# Patient Record
Sex: MALE | Employment: FULL TIME
[De-identification: names, ages, dates, MRNs, and addresses within clinical notes are randomized per-mention and may not be internally consistent; named-entity substitution may affect disease eponyms.]

---

## 2022-09-06 ENCOUNTER — OFFICE VISIT (OUTPATIENT)
Dept: INTERNAL MEDICINE CLINIC | Facility: CLINIC | Age: 52
End: 2022-09-06
Payer: COMMERCIAL

## 2022-09-06 VITALS
SYSTOLIC BLOOD PRESSURE: 127 MMHG | HEIGHT: 69 IN | BODY MASS INDEX: 30.81 KG/M2 | HEART RATE: 101 BPM | DIASTOLIC BLOOD PRESSURE: 85 MMHG | WEIGHT: 208 LBS | OXYGEN SATURATION: 98 % | TEMPERATURE: 98.1 F

## 2022-09-06 DIAGNOSIS — E78.49 OTHER HYPERLIPIDEMIA: ICD-10-CM

## 2022-09-06 DIAGNOSIS — I10 ESSENTIAL HYPERTENSION: ICD-10-CM

## 2022-09-06 DIAGNOSIS — R74.8 ELEVATED LIVER ENZYMES: ICD-10-CM

## 2022-09-06 DIAGNOSIS — I10 ESSENTIAL HYPERTENSION: Primary | ICD-10-CM

## 2022-09-06 LAB
ALBUMIN SERPL-MCNC: 4.2 G/DL (ref 3.5–5)
ALBUMIN/GLOB SERPL: 1 {RATIO} (ref 1.2–3.5)
ALP SERPL-CCNC: 41 U/L (ref 50–136)
ALT SERPL-CCNC: 55 U/L (ref 12–65)
ANION GAP SERPL CALC-SCNC: 5 MMOL/L (ref 4–13)
AST SERPL-CCNC: 89 U/L (ref 15–37)
BASOPHILS # BLD: 0 K/UL (ref 0–0.2)
BASOPHILS NFR BLD: 1 % (ref 0–2)
BILIRUB DIRECT SERPL-MCNC: 0.6 MG/DL
BILIRUB SERPL-MCNC: 1.3 MG/DL (ref 0.2–1.1)
BUN SERPL-MCNC: 7 MG/DL (ref 6–23)
CALCIUM SERPL-MCNC: 10.2 MG/DL (ref 8.3–10.4)
CHLORIDE SERPL-SCNC: 100 MMOL/L (ref 101–110)
CHOLEST SERPL-MCNC: 217 MG/DL
CO2 SERPL-SCNC: 29 MMOL/L (ref 21–32)
CREAT SERPL-MCNC: 1 MG/DL (ref 0.8–1.5)
DIFFERENTIAL METHOD BLD: NORMAL
EOSINOPHIL # BLD: 0.2 K/UL (ref 0–0.8)
EOSINOPHIL NFR BLD: 3 % (ref 0.5–7.8)
ERYTHROCYTE [DISTWIDTH] IN BLOOD BY AUTOMATED COUNT: 12.5 % (ref 11.9–14.6)
GLOBULIN SER CALC-MCNC: 4.2 G/DL (ref 2.3–3.5)
GLUCOSE SERPL-MCNC: 96 MG/DL (ref 65–100)
HCT VFR BLD AUTO: 45 % (ref 41.1–50.3)
HDLC SERPL-MCNC: 46 MG/DL (ref 40–60)
HDLC SERPL: 4.7 {RATIO}
HGB BLD-MCNC: 14.9 G/DL (ref 13.6–17.2)
IMM GRANULOCYTES # BLD AUTO: 0 K/UL (ref 0–0.5)
IMM GRANULOCYTES NFR BLD AUTO: 0 % (ref 0–5)
LDLC SERPL CALC-MCNC: 140.8 MG/DL
LYMPHOCYTES # BLD: 1.7 K/UL (ref 0.5–4.6)
LYMPHOCYTES NFR BLD: 28 % (ref 13–44)
MCH RBC QN AUTO: 31.5 PG (ref 26.1–32.9)
MCHC RBC AUTO-ENTMCNC: 33.1 G/DL (ref 31.4–35)
MCV RBC AUTO: 95.1 FL (ref 79.6–97.8)
MONOCYTES # BLD: 0.7 K/UL (ref 0.1–1.3)
MONOCYTES NFR BLD: 11 % (ref 4–12)
NEUTS SEG # BLD: 3.3 K/UL (ref 1.7–8.2)
NEUTS SEG NFR BLD: 57 % (ref 43–78)
NRBC # BLD: 0 K/UL (ref 0–0.2)
PLATELET # BLD AUTO: 202 K/UL (ref 150–450)
PMV BLD AUTO: 10 FL (ref 9.4–12.3)
POTASSIUM SERPL-SCNC: 3.7 MMOL/L (ref 3.5–5.1)
PROT SERPL-MCNC: 8.4 G/DL (ref 6.3–8.2)
RBC # BLD AUTO: 4.73 M/UL (ref 4.23–5.6)
SODIUM SERPL-SCNC: 134 MMOL/L (ref 138–145)
TRIGL SERPL-MCNC: 151 MG/DL (ref 35–150)
VLDLC SERPL CALC-MCNC: 30.2 MG/DL (ref 6–23)
WBC # BLD AUTO: 5.9 K/UL (ref 4.3–11.1)

## 2022-09-06 PROCEDURE — 99214 OFFICE O/P EST MOD 30 MIN: CPT | Performed by: INTERNAL MEDICINE

## 2022-09-06 RX ORDER — FENOFIBRATE 160 MG/1
160 TABLET ORAL DAILY
Qty: 90 TABLET | Refills: 1 | Status: CANCELLED | OUTPATIENT
Start: 2022-09-06

## 2022-09-06 RX ORDER — LISINOPRIL 20 MG/1
20 TABLET ORAL DAILY
Qty: 90 TABLET | Refills: 1 | Status: SHIPPED | OUTPATIENT
Start: 2022-09-06

## 2022-09-06 SDOH — ECONOMIC STABILITY: FOOD INSECURITY: WITHIN THE PAST 12 MONTHS, THE FOOD YOU BOUGHT JUST DIDN'T LAST AND YOU DIDN'T HAVE MONEY TO GET MORE.: NEVER TRUE

## 2022-09-06 SDOH — ECONOMIC STABILITY: FOOD INSECURITY: WITHIN THE PAST 12 MONTHS, YOU WORRIED THAT YOUR FOOD WOULD RUN OUT BEFORE YOU GOT MONEY TO BUY MORE.: NEVER TRUE

## 2022-09-06 ASSESSMENT — PATIENT HEALTH QUESTIONNAIRE - PHQ9
SUM OF ALL RESPONSES TO PHQ QUESTIONS 1-9: 0
9. THOUGHTS THAT YOU WOULD BE BETTER OFF DEAD, OR OF HURTING YOURSELF: 0
SUM OF ALL RESPONSES TO PHQ QUESTIONS 1-9: 0
2. FEELING DOWN, DEPRESSED OR HOPELESS: 0
8. MOVING OR SPEAKING SO SLOWLY THAT OTHER PEOPLE COULD HAVE NOTICED. OR THE OPPOSITE, BEING SO FIGETY OR RESTLESS THAT YOU HAVE BEEN MOVING AROUND A LOT MORE THAN USUAL: 0
6. FEELING BAD ABOUT YOURSELF - OR THAT YOU ARE A FAILURE OR HAVE LET YOURSELF OR YOUR FAMILY DOWN: 0
SUM OF ALL RESPONSES TO PHQ9 QUESTIONS 1 & 2: 0
10. IF YOU CHECKED OFF ANY PROBLEMS, HOW DIFFICULT HAVE THESE PROBLEMS MADE IT FOR YOU TO DO YOUR WORK, TAKE CARE OF THINGS AT HOME, OR GET ALONG WITH OTHER PEOPLE: 0
3. TROUBLE FALLING OR STAYING ASLEEP: 0
4. FEELING TIRED OR HAVING LITTLE ENERGY: 0
SUM OF ALL RESPONSES TO PHQ QUESTIONS 1-9: 0
7. TROUBLE CONCENTRATING ON THINGS, SUCH AS READING THE NEWSPAPER OR WATCHING TELEVISION: 0
SUM OF ALL RESPONSES TO PHQ QUESTIONS 1-9: 0
1. LITTLE INTEREST OR PLEASURE IN DOING THINGS: 0
5. POOR APPETITE OR OVEREATING: 0

## 2022-09-06 ASSESSMENT — ENCOUNTER SYMPTOMS
ABDOMINAL PAIN: 0
SHORTNESS OF BREATH: 0

## 2022-09-06 ASSESSMENT — SOCIAL DETERMINANTS OF HEALTH (SDOH): HOW HARD IS IT FOR YOU TO PAY FOR THE VERY BASICS LIKE FOOD, HOUSING, MEDICAL CARE, AND HEATING?: NOT HARD AT ALL

## 2022-09-06 NOTE — PROGRESS NOTES
Rea Adams M.D. Internal Medicine  [address] [facility] [address] , [address]   Office : [phone]  Fax : [phone]    Chief Complaint   Patient presents with    Hypertension     4 mo follow up       History of Present Illness:  Tripp Connor is a 50 y.o. male. HPI    Hypertension  Patient is in for Hypertension which is stable. Diet and Lifestyle: generally follows a low sodium diet  exercises sporadically  nonsmoker  Home BP Monitoring: is not measured at home . Patient's recent blood pressures were   BP Readings from Last 3 Encounters:   09/06/22 127/85   05/02/22 108/70   12/28/21 109/69   . taking medications as instructed, no medication side effects noted, no TIA's, no chest pain on exertion, no dyspnea on exertion, no swelling of ankles. Cardiac risk factors consist of dyslipidemia, hypertension, and male gender. Hyperlipidemia  Patient is in for follow-up for hyperlipidemia. Diet and Lifestyle: generally follows a low fat low cholesterol diet. Risk factors for vascular disease consist of hyperlipidemia and hypertension. Last LDL was   Lab Results   Component Value Date    LDLCALC 158 (H) 12/28/2021   . Last ALT was   Lab Results   Component Value Date/Time    ALT 26 12/28/2021 04:16 PM   .  No muscle aches. Has been on fenofibrate since prior to 2013 and triglycerides have not been very high so will stop it      Alcohol/Elevated Liver Enzymes  He is still drinking too much alcohol. Tried lexapro intermittently . He could not tolerate naltrexone due to nausea.    Discussed FAVOR  Lab Results   Component Value Date    ALT 26 12/28/2021    AST 48 (H) 12/28/2021    ALKPHOS 31 (L) 12/28/2021    BILITOT 0.5 12/28/2021         Past Medical History:  Past Medical History:   Diagnosis Date    Elevated liver enzymes     Drinks 5 light beers a day    Hyperlipidemia     CT Calcium score = 0 in 2015    Hypertension      Past Surgical History:  Past Surgical History:   Procedure Laterality Date    COLONOSCOPY  2012    VASECTOMY       Allergies:   No Known Allergies  Medications:   Current Outpatient Medications   Medication Sig Dispense Refill    lisinopril (PRINIVIL;ZESTRIL) 20 MG tablet Take 1 tablet by mouth daily TAKE ONE TABLET BY MOUTH EVERY DAY 90 tablet 1    fenofibrate (TRIGLIDE) 160 MG tablet Take 160 mg by mouth daily      mupirocin (BACTROBAN) 2 % ointment Apply topically daily       No current facility-administered medications for this visit. Social History:  Social History     Tobacco Use    Smoking status: Never    Smokeless tobacco: Never   Substance Use Topics    Alcohol use: Yes     Alcohol/week: 41.0 standard drinks     Family History  Family History   Problem Relation Age of Onset    Cancer Mother         breast    Cancer Father         colon       Review of Systems   Constitutional:  Negative for chills, fatigue and fever. Respiratory:  Negative for shortness of breath. Cardiovascular:  Negative for chest pain. Gastrointestinal:  Negative for abdominal pain. Skin:  Negative for rash. Neurological:  Negative for speech difficulty. Psychiatric/Behavioral:  Negative for dysphoric mood. Vital Signs  /85 (Site: Left Upper Arm, Position: Sitting, Cuff Size: Large Adult)   Pulse (!) 101   Temp 98.1 °F (36.7 °C) (Temporal)   Ht 5' 9\" (1.753 m)   Wt 208 lb (94.3 kg)   SpO2 98%   BMI 30.72 kg/m²   Body mass index is 30.72 kg/m². Physical Exam  Vitals reviewed. Constitutional:       General: He is not in acute distress. Appearance: Normal appearance. He is not ill-appearing. HENT:      Head: Normocephalic and atraumatic. Eyes:      General: No scleral icterus. Extraocular Movements: Extraocular movements intact. Conjunctiva/sclera: Conjunctivae normal.   Neck:      Vascular: No carotid bruit. Cardiovascular:      Rate and Rhythm: Normal rate and regular rhythm.       Heart sounds: Normal heart sounds. No murmur heard. Pulmonary:      Effort: Pulmonary effort is normal.      Breath sounds: Normal breath sounds. Musculoskeletal:         General: No swelling. Skin:     Coloration: Skin is not jaundiced. Findings: No rash. Neurological:      General: No focal deficit present. Mental Status: He is alert. Mental status is at baseline. Cranial Nerves: No cranial nerve deficit. Motor: No weakness. Gait: Gait normal.   Psychiatric:         Mood and Affect: Mood normal.         Behavior: Behavior normal.         Thought Content: Thought content normal.         Judgment: Judgment normal.         Assessment/Plan:  Doretha Evangelista was seen today for hypertension. Diagnoses and all orders for this visit:    Essential hypertension  -     lisinopril (PRINIVIL;ZESTRIL) 20 MG tablet; Take 1 tablet by mouth daily TAKE ONE TABLET BY MOUTH EVERY DAY  -     CBC with Auto Differential; Future  -     Basic Metabolic Panel; Future    Other hyperlipidemia  -     Lipid Panel; Future  -     Hepatic Function Panel; Future    Elevated liver enzymes  -     Hepatic Function Panel; Future      Return in about 6 months (around 3/6/2023), or if symptoms worsen or fail to improve.   __  Sari Hawkins M.D.

## 2022-10-27 RX ORDER — FENOFIBRATE 160 MG/1
TABLET ORAL
Qty: 90 TABLET | Refills: 1 | OUTPATIENT
Start: 2022-10-27

## 2022-12-27 RX ORDER — FENOFIBRATE 160 MG/1
160 TABLET ORAL DAILY
Qty: 90 TABLET | Refills: 0 | Status: SHIPPED | OUTPATIENT
Start: 2022-12-27

## 2022-12-27 NOTE — TELEPHONE ENCOUNTER
Pt called in requesting medication refill for  fenofibrate (TRIGLIDE) 160 MG tablet  To be sent to [facility] in [place]. Pt can be reached at [phone]. Thank you.

## 2023-03-06 ENCOUNTER — OFFICE VISIT (OUTPATIENT)
Dept: INTERNAL MEDICINE CLINIC | Facility: CLINIC | Age: 53
End: 2023-03-06
Payer: COMMERCIAL

## 2023-03-06 VITALS
DIASTOLIC BLOOD PRESSURE: 75 MMHG | WEIGHT: 218 LBS | SYSTOLIC BLOOD PRESSURE: 116 MMHG | BODY MASS INDEX: 32.29 KG/M2 | HEIGHT: 69 IN | OXYGEN SATURATION: 98 % | TEMPERATURE: 97.6 F | HEART RATE: 67 BPM

## 2023-03-06 DIAGNOSIS — E78.49 OTHER HYPERLIPIDEMIA: Primary | ICD-10-CM

## 2023-03-06 DIAGNOSIS — F10.10 ALCOHOL ABUSE: ICD-10-CM

## 2023-03-06 DIAGNOSIS — I10 ESSENTIAL HYPERTENSION: ICD-10-CM

## 2023-03-06 PROCEDURE — 3074F SYST BP LT 130 MM HG: CPT | Performed by: INTERNAL MEDICINE

## 2023-03-06 PROCEDURE — 99214 OFFICE O/P EST MOD 30 MIN: CPT | Performed by: INTERNAL MEDICINE

## 2023-03-06 PROCEDURE — 3078F DIAST BP <80 MM HG: CPT | Performed by: INTERNAL MEDICINE

## 2023-03-06 RX ORDER — FENOFIBRATE 160 MG/1
160 TABLET ORAL DAILY
Qty: 90 TABLET | Refills: 1 | Status: SHIPPED | OUTPATIENT
Start: 2023-03-06

## 2023-03-06 RX ORDER — LISINOPRIL 20 MG/1
TABLET ORAL
Qty: 90 TABLET | Refills: 1 | Status: SHIPPED | OUTPATIENT
Start: 2023-03-06

## 2023-03-06 SDOH — ECONOMIC STABILITY: INCOME INSECURITY: HOW HARD IS IT FOR YOU TO PAY FOR THE VERY BASICS LIKE FOOD, HOUSING, MEDICAL CARE, AND HEATING?: NOT HARD AT ALL

## 2023-03-06 SDOH — ECONOMIC STABILITY: FOOD INSECURITY: WITHIN THE PAST 12 MONTHS, YOU WORRIED THAT YOUR FOOD WOULD RUN OUT BEFORE YOU GOT MONEY TO BUY MORE.: NEVER TRUE

## 2023-03-06 SDOH — ECONOMIC STABILITY: FOOD INSECURITY: WITHIN THE PAST 12 MONTHS, THE FOOD YOU BOUGHT JUST DIDN'T LAST AND YOU DIDN'T HAVE MONEY TO GET MORE.: NEVER TRUE

## 2023-03-06 SDOH — ECONOMIC STABILITY: HOUSING INSECURITY
IN THE LAST 12 MONTHS, WAS THERE A TIME WHEN YOU DID NOT HAVE A STEADY PLACE TO SLEEP OR SLEPT IN A SHELTER (INCLUDING NOW)?: NO

## 2023-03-06 ASSESSMENT — PATIENT HEALTH QUESTIONNAIRE - PHQ9
SUM OF ALL RESPONSES TO PHQ QUESTIONS 1-9: 0
5. POOR APPETITE OR OVEREATING: 0
3. TROUBLE FALLING OR STAYING ASLEEP: 0
4. FEELING TIRED OR HAVING LITTLE ENERGY: 0
SUM OF ALL RESPONSES TO PHQ QUESTIONS 1-9: 0
SUM OF ALL RESPONSES TO PHQ QUESTIONS 1-9: 0
9. THOUGHTS THAT YOU WOULD BE BETTER OFF DEAD, OR OF HURTING YOURSELF: 0
4. FEELING TIRED OR HAVING LITTLE ENERGY: 0
2. FEELING DOWN, DEPRESSED OR HOPELESS: 0
SUM OF ALL RESPONSES TO PHQ QUESTIONS 1-9: 0
6. FEELING BAD ABOUT YOURSELF - OR THAT YOU ARE A FAILURE OR HAVE LET YOURSELF OR YOUR FAMILY DOWN: 0
SUM OF ALL RESPONSES TO PHQ9 QUESTIONS 1 & 2: 0
10. IF YOU CHECKED OFF ANY PROBLEMS, HOW DIFFICULT HAVE THESE PROBLEMS MADE IT FOR YOU TO DO YOUR WORK, TAKE CARE OF THINGS AT HOME, OR GET ALONG WITH OTHER PEOPLE: 0
SUM OF ALL RESPONSES TO PHQ QUESTIONS 1-9: 0
5. POOR APPETITE OR OVEREATING: 0
SUM OF ALL RESPONSES TO PHQ QUESTIONS 1-9: 0
8. MOVING OR SPEAKING SO SLOWLY THAT OTHER PEOPLE COULD HAVE NOTICED. OR THE OPPOSITE, BEING SO FIGETY OR RESTLESS THAT YOU HAVE BEEN MOVING AROUND A LOT MORE THAN USUAL: 0
SUM OF ALL RESPONSES TO PHQ QUESTIONS 1-9: 0
10. IF YOU CHECKED OFF ANY PROBLEMS, HOW DIFFICULT HAVE THESE PROBLEMS MADE IT FOR YOU TO DO YOUR WORK, TAKE CARE OF THINGS AT HOME, OR GET ALONG WITH OTHER PEOPLE: 0
2. FEELING DOWN, DEPRESSED OR HOPELESS: 0
8. MOVING OR SPEAKING SO SLOWLY THAT OTHER PEOPLE COULD HAVE NOTICED. OR THE OPPOSITE, BEING SO FIGETY OR RESTLESS THAT YOU HAVE BEEN MOVING AROUND A LOT MORE THAN USUAL: 0
3. TROUBLE FALLING OR STAYING ASLEEP: 0
9. THOUGHTS THAT YOU WOULD BE BETTER OFF DEAD, OR OF HURTING YOURSELF: 0
6. FEELING BAD ABOUT YOURSELF - OR THAT YOU ARE A FAILURE OR HAVE LET YOURSELF OR YOUR FAMILY DOWN: 0
1. LITTLE INTEREST OR PLEASURE IN DOING THINGS: 0
7. TROUBLE CONCENTRATING ON THINGS, SUCH AS READING THE NEWSPAPER OR WATCHING TELEVISION: 0
SUM OF ALL RESPONSES TO PHQ QUESTIONS 1-9: 0
7. TROUBLE CONCENTRATING ON THINGS, SUCH AS READING THE NEWSPAPER OR WATCHING TELEVISION: 0

## 2023-03-06 ASSESSMENT — ANXIETY QUESTIONNAIRES
2. NOT BEING ABLE TO STOP OR CONTROL WORRYING: 0
IF YOU CHECKED OFF ANY PROBLEMS ON THIS QUESTIONNAIRE, HOW DIFFICULT HAVE THESE PROBLEMS MADE IT FOR YOU TO DO YOUR WORK, TAKE CARE OF THINGS AT HOME, OR GET ALONG WITH OTHER PEOPLE: NOT DIFFICULT AT ALL
6. BECOMING EASILY ANNOYED OR IRRITABLE: 0
3. WORRYING TOO MUCH ABOUT DIFFERENT THINGS: 0
4. TROUBLE RELAXING: 0
GAD7 TOTAL SCORE: 0
7. FEELING AFRAID AS IF SOMETHING AWFUL MIGHT HAPPEN: 0
1. FEELING NERVOUS, ANXIOUS, OR ON EDGE: 0
5. BEING SO RESTLESS THAT IT IS HARD TO SIT STILL: 0

## 2023-03-06 ASSESSMENT — ENCOUNTER SYMPTOMS: SHORTNESS OF BREATH: 0

## 2023-03-06 NOTE — PROGRESS NOTES
Naomi Callahan M.D. Internal Medicine  [address] [place] [address], [facility] [address] [place] [address]  Office : [phone]  Fax : [phone]    Chief Complaint   Patient presents with    Hypertension     6 mo follow up       History of Present Illness:  Truman Thomas is a 50 y.o. male. HPI    Hypertension  Patient is in for Hypertension which is . Diet and Lifestyle: generally follows a low sodium diet  exercises sporadically  nonsmoker  Home BP Monitoring: is not measured at home . Patient's recent blood pressures were stable  BP Readings from Last 3 Encounters:   03/06/23 116/75   09/06/22 127/85   05/02/22 108/70   . taking medications as instructed, no medication side effects noted, no TIA's, no chest pain on exertion, no dyspnea on exertion, no swelling of ankles. Cardiac risk factors consist of dyslipidemia, hypertension, and male gender. Lab Results   Component Value Date/Time     09/06/2022 04:05 PM    K 3.7 09/06/2022 04:05 PM     09/06/2022 04:05 PM    CO2 29 09/06/2022 04:05 PM    BUN 7 09/06/2022 04:05 PM    CREATININE 1.00 09/06/2022 04:05 PM    GLUCOSE 96 09/06/2022 04:05 PM    CALCIUM 10.2 09/06/2022 04:05 PM        Hyperlipidemia  Patient is in for follow-up for hyperlipidemia. Diet and Lifestyle: generally follows a low fat low cholesterol diet. Risk factors for vascular disease consist of hyperlipidemia and hypertension. Last LDL was   Lab Results   Component Value Date    LDLCALC 140.8 (H) 09/06/2022   . Last ALT was   Lab Results   Component Value Date/Time    ALT 55 09/06/2022 04:05 PM   .  No muscle aches. Alcohol/Elevated Liver Enzymes  Stopped drinking for the entire month of February but fell off the wagon. It is causing marital and family strife.   He wants help  Lab Results   Component Value Date    ALT 55 09/06/2022    AST 89 (H) 09/06/2022    ALKPHOS 41 (L) 09/06/2022    BILITOT 1.3 (H) 09/06/2022         Past Medical History:  Past Medical History:   Diagnosis Date    Elevated liver enzymes     Drinks 5 light beers a day    Hyperlipidemia     CT Calcium score = 0 in 2015    Hypertension      Past Surgical History:  Past Surgical History:   Procedure Laterality Date    COLONOSCOPY  2012    VASECTOMY       Allergies:   No Known Allergies  Medications:   Current Outpatient Medications   Medication Sig Dispense Refill    fenofibrate (TRIGLIDE) 160 MG tablet Take 1 tablet by mouth daily 90 tablet 1    lisinopril (PRINIVIL;ZESTRIL) 20 MG tablet TAKE ONE TABLET BY MOUTH EVERY DAY 90 tablet 1    mupirocin (BACTROBAN) 2 % ointment Apply topically daily (Patient not taking: Reported on 3/6/2023)       No current facility-administered medications for this visit. Social History:  Social History     Tobacco Use    Smoking status: Never    Smokeless tobacco: Never   Substance Use Topics    Alcohol use: Yes     Alcohol/week: 41.0 standard drinks     Family History  Family History   Problem Relation Age of Onset    Cancer Mother         breast    Cancer Father         colon       Review of Systems   Constitutional:  Negative for chills, fatigue and fever. Respiratory:  Negative for shortness of breath. Cardiovascular:  Negative for chest pain. Musculoskeletal:  Negative for gait problem. Skin:  Negative for rash. Neurological:  Negative for speech difficulty. Psychiatric/Behavioral:  Negative for dysphoric mood. Vital Signs  /75 (Site: Left Upper Arm, Position: Sitting, Cuff Size: Large Adult)   Pulse 67   Temp 97.6 °F (36.4 °C) (Temporal)   Ht 5' 9\" (1.753 m)   Wt 218 lb (98.9 kg)   SpO2 98%   BMI 32.19 kg/m²   Body mass index is 32.19 kg/m². Physical Exam  Vitals reviewed. Constitutional:       General: He is not in acute distress. Appearance: Normal appearance. He is not ill-appearing. HENT:      Head: Normocephalic and atraumatic. Eyes:      General: No scleral icterus. Conjunctiva/sclera: Conjunctivae normal.   Neck:      Vascular: No carotid bruit. Cardiovascular:      Rate and Rhythm: Normal rate and regular rhythm. Heart sounds: Normal heart sounds. No murmur heard. Pulmonary:      Effort: Pulmonary effort is normal.      Breath sounds: Normal breath sounds. Musculoskeletal:         General: No swelling. Skin:     Coloration: Skin is not jaundiced. Findings: No rash. Neurological:      General: No focal deficit present. Mental Status: He is alert. Mental status is at baseline. Cranial Nerves: No cranial nerve deficit. Motor: No weakness. Gait: Gait normal.   Psychiatric:         Mood and Affect: Mood normal.         Behavior: Behavior normal.         Assessment/Plan:  Robetr Love was seen today for hypertension. Diagnoses and all orders for this visit:    Other hyperlipidemia  -     fenofibrate (TRIGLIDE) 160 MG tablet; Take 1 tablet by mouth daily    Essential hypertension  -     lisinopril (PRINIVIL;ZESTRIL) 20 MG tablet; TAKE ONE TABLET BY MOUTH EVERY DAY    Alcohol abuse  -     [address] [place] [address] (Counseling)      Return in about 6 months (around 9/6/2023), or if symptoms worsen or fail to improve.   __  John Martin M.D.

## 2023-05-28 DIAGNOSIS — I10 ESSENTIAL HYPERTENSION: ICD-10-CM

## 2023-05-30 RX ORDER — LISINOPRIL 20 MG/1
TABLET ORAL
Qty: 90 TABLET | Refills: 1 | OUTPATIENT
Start: 2023-05-30

## 2023-09-28 ENCOUNTER — APPOINTMENT (RX ONLY)
Dept: URBAN - METROPOLITAN AREA CLINIC 23 | Facility: CLINIC | Age: 53
Setting detail: DERMATOLOGY
End: 2023-09-28

## 2023-09-28 DIAGNOSIS — D17 BENIGN LIPOMATOUS NEOPLASM: ICD-10-CM

## 2023-09-28 PROBLEM — D17.30 BENIGN LIPOMATOUS NEOPLASM OF SKIN AND SUBCUTANEOUS TISSUE OF UNSPECIFIED SITES: Status: ACTIVE | Noted: 2023-09-28

## 2023-09-28 PROCEDURE — 99202 OFFICE O/P NEW SF 15 MIN: CPT

## 2023-09-28 PROCEDURE — ? DEFER

## 2023-09-28 PROCEDURE — ? COUNSELING

## 2023-09-28 NOTE — PROCEDURE: DEFER
Procedure To Be Performed At Next Visit: Excision
Introduction Text (Please End With A Colon): The following procedure was deferred:
Size Of Lesion In Cm (Optional): 0
Detail Level: Detailed
Scheduling Instructions (Optional): 30 minute

## 2023-10-12 ENCOUNTER — APPOINTMENT (RX ONLY)
Dept: URBAN - METROPOLITAN AREA CLINIC 23 | Facility: CLINIC | Age: 53
Setting detail: DERMATOLOGY
End: 2023-10-12

## 2023-10-12 DIAGNOSIS — D17 BENIGN LIPOMATOUS NEOPLASM: ICD-10-CM

## 2023-10-12 PROBLEM — D17.22 BENIGN LIPOMATOUS NEOPLASM OF SKIN AND SUBCUTANEOUS TISSUE OF LEFT ARM: Status: ACTIVE | Noted: 2023-10-12

## 2023-10-12 PROCEDURE — 11406 EXC TR-EXT B9+MARG >4.0 CM: CPT

## 2023-10-12 PROCEDURE — ? EXCISION

## 2023-10-12 PROCEDURE — A4550 SURGICAL TRAYS: HCPCS

## 2023-10-12 PROCEDURE — 13121 CMPLX RPR S/A/L 2.6-7.5 CM: CPT

## 2023-10-12 ASSESSMENT — LOCATION SIMPLE DESCRIPTION DERM: LOCATION SIMPLE: LEFT UPPER ARM

## 2023-10-12 ASSESSMENT — LOCATION DETAILED DESCRIPTION DERM: LOCATION DETAILED: LEFT LATERAL PROXIMAL UPPER ARM

## 2023-10-12 ASSESSMENT — LOCATION ZONE DERM: LOCATION ZONE: ARM

## 2023-10-12 NOTE — PROCEDURE: EXCISION

## 2023-10-26 ENCOUNTER — APPOINTMENT (RX ONLY)
Dept: URBAN - METROPOLITAN AREA CLINIC 23 | Facility: CLINIC | Age: 53
Setting detail: DERMATOLOGY
End: 2023-10-26

## 2023-10-26 DIAGNOSIS — Z48.01 ENCOUNTER FOR CHANGE OR REMOVAL OF SURGICAL WOUND DRESSING: ICD-10-CM

## 2023-10-26 PROCEDURE — ? SUTURE REMOVAL (GLOBAL PERIOD)

## 2023-10-26 PROCEDURE — 99024 POSTOP FOLLOW-UP VISIT: CPT

## 2023-10-26 ASSESSMENT — LOCATION ZONE DERM: LOCATION ZONE: ARM

## 2023-10-26 ASSESSMENT — LOCATION DETAILED DESCRIPTION DERM: LOCATION DETAILED: LEFT LATERAL PROXIMAL UPPER ARM

## 2023-10-26 ASSESSMENT — LOCATION SIMPLE DESCRIPTION DERM: LOCATION SIMPLE: LEFT UPPER ARM

## 2023-10-26 NOTE — PROCEDURE: SUTURE REMOVAL (GLOBAL PERIOD)
Detail Level: Detailed
Add 94794 Cpt? (Important Note: In 2017 The Use Of 20249 Is Being Tracked By Cms To Determine Future Global Period Reimbursement For Global Periods): yes

## 2023-12-15 DIAGNOSIS — I10 ESSENTIAL HYPERTENSION: ICD-10-CM

## 2023-12-15 RX ORDER — LISINOPRIL 20 MG/1
TABLET ORAL
Qty: 90 TABLET | Refills: 1 | OUTPATIENT
Start: 2023-12-15

## 2023-12-24 DIAGNOSIS — I10 ESSENTIAL HYPERTENSION: ICD-10-CM

## 2023-12-26 RX ORDER — LISINOPRIL 20 MG/1
TABLET ORAL
Qty: 90 TABLET | Refills: 1 | OUTPATIENT
Start: 2023-12-26

## 2023-12-28 ENCOUNTER — OFFICE VISIT (OUTPATIENT)
Dept: INTERNAL MEDICINE CLINIC | Facility: CLINIC | Age: 53
End: 2023-12-28
Payer: COMMERCIAL

## 2023-12-28 VITALS
SYSTOLIC BLOOD PRESSURE: 133 MMHG | DIASTOLIC BLOOD PRESSURE: 88 MMHG | HEART RATE: 70 BPM | HEIGHT: 69 IN | WEIGHT: 222 LBS | BODY MASS INDEX: 32.88 KG/M2 | OXYGEN SATURATION: 97 % | TEMPERATURE: 98.6 F

## 2023-12-28 DIAGNOSIS — E78.49 OTHER HYPERLIPIDEMIA: ICD-10-CM

## 2023-12-28 DIAGNOSIS — Z12.5 SCREENING FOR PROSTATE CANCER: ICD-10-CM

## 2023-12-28 DIAGNOSIS — I10 ESSENTIAL HYPERTENSION: ICD-10-CM

## 2023-12-28 DIAGNOSIS — F10.10 ALCOHOL ABUSE: ICD-10-CM

## 2023-12-28 DIAGNOSIS — E78.49 OTHER HYPERLIPIDEMIA: Primary | ICD-10-CM

## 2023-12-28 LAB
ALBUMIN SERPL-MCNC: 3.9 G/DL (ref 3.5–5)
ALBUMIN/GLOB SERPL: 1 (ref 0.4–1.6)
ALP SERPL-CCNC: 43 U/L (ref 50–136)
ALT SERPL-CCNC: 43 U/L (ref 12–65)
ANION GAP SERPL CALC-SCNC: 11 MMOL/L (ref 2–11)
AST SERPL-CCNC: 51 U/L (ref 15–37)
BASOPHILS # BLD: 0 K/UL (ref 0–0.2)
BASOPHILS NFR BLD: 1 % (ref 0–2)
BILIRUB DIRECT SERPL-MCNC: 0.3 MG/DL
BILIRUB SERPL-MCNC: 0.6 MG/DL (ref 0.2–1.1)
BUN SERPL-MCNC: 9 MG/DL (ref 6–23)
CALCIUM SERPL-MCNC: 9.5 MG/DL (ref 8.3–10.4)
CHLORIDE SERPL-SCNC: 103 MMOL/L (ref 103–113)
CHOLEST SERPL-MCNC: 203 MG/DL
CO2 SERPL-SCNC: 25 MMOL/L (ref 21–32)
CREAT SERPL-MCNC: 0.9 MG/DL (ref 0.8–1.5)
DIFFERENTIAL METHOD BLD: ABNORMAL
EOSINOPHIL # BLD: 0.2 K/UL (ref 0–0.8)
EOSINOPHIL NFR BLD: 4 % (ref 0.5–7.8)
ERYTHROCYTE [DISTWIDTH] IN BLOOD BY AUTOMATED COUNT: 12.4 % (ref 11.9–14.6)
GLOBULIN SER CALC-MCNC: 4 G/DL (ref 2.8–4.5)
GLUCOSE SERPL-MCNC: 99 MG/DL (ref 65–100)
HCT VFR BLD AUTO: 44.6 % (ref 41.1–50.3)
HDLC SERPL-MCNC: 48 MG/DL (ref 40–60)
HDLC SERPL: 4.2
HGB BLD-MCNC: 15.4 G/DL (ref 13.6–17.2)
IMM GRANULOCYTES # BLD AUTO: 0 K/UL (ref 0–0.5)
IMM GRANULOCYTES NFR BLD AUTO: 0 % (ref 0–5)
LDLC SERPL CALC-MCNC: 112.2 MG/DL
LYMPHOCYTES # BLD: 2.1 K/UL (ref 0.5–4.6)
LYMPHOCYTES NFR BLD: 49 % (ref 13–44)
MCH RBC QN AUTO: 30.7 PG (ref 26.1–32.9)
MCHC RBC AUTO-ENTMCNC: 34.5 G/DL (ref 31.4–35)
MCV RBC AUTO: 89 FL (ref 82–102)
MONOCYTES # BLD: 0.3 K/UL (ref 0.1–1.3)
MONOCYTES NFR BLD: 8 % (ref 4–12)
NEUTS SEG # BLD: 1.6 K/UL (ref 1.7–8.2)
NEUTS SEG NFR BLD: 38 % (ref 43–78)
NRBC # BLD: 0 K/UL (ref 0–0.2)
PLATELET # BLD AUTO: 268 K/UL (ref 150–450)
PMV BLD AUTO: 9.1 FL (ref 9.4–12.3)
POTASSIUM SERPL-SCNC: 3.9 MMOL/L (ref 3.5–5.1)
PROT SERPL-MCNC: 7.9 G/DL (ref 6.3–8.2)
PSA SERPL-MCNC: 1 NG/ML
RBC # BLD AUTO: 5.01 M/UL (ref 4.23–5.6)
SODIUM SERPL-SCNC: 139 MMOL/L (ref 136–146)
TRIGL SERPL-MCNC: 214 MG/DL (ref 35–150)
VLDLC SERPL CALC-MCNC: 42.8 MG/DL (ref 6–23)
WBC # BLD AUTO: 4.2 K/UL (ref 4.3–11.1)

## 2023-12-28 PROCEDURE — 99214 OFFICE O/P EST MOD 30 MIN: CPT | Performed by: INTERNAL MEDICINE

## 2023-12-28 PROCEDURE — 3075F SYST BP GE 130 - 139MM HG: CPT | Performed by: INTERNAL MEDICINE

## 2023-12-28 PROCEDURE — 3079F DIAST BP 80-89 MM HG: CPT | Performed by: INTERNAL MEDICINE

## 2023-12-28 RX ORDER — LISINOPRIL 20 MG/1
TABLET ORAL
Qty: 90 TABLET | Refills: 1 | Status: SHIPPED | OUTPATIENT
Start: 2023-12-28

## 2023-12-28 RX ORDER — NALTREXONE HYDROCHLORIDE 50 MG/1
50 TABLET, FILM COATED ORAL DAILY
Qty: 90 TABLET | Refills: 1 | Status: SHIPPED | OUTPATIENT
Start: 2023-12-28

## 2023-12-28 RX ORDER — FENOFIBRATE 160 MG/1
160 TABLET ORAL DAILY
Qty: 90 TABLET | Refills: 1 | Status: SHIPPED | OUTPATIENT
Start: 2023-12-28

## 2023-12-28 NOTE — PROGRESS NOTES
Bradley Russell M.D. Internal Medicine  [address] [facility], [address] [place] [address]  Office : [phone]  Fax : [phone]    Chief Complaint   Patient presents with    Hypertension       History of Present Illness:  Jacob Kirk is a 48 y.o. male. HPI    Hypertension  Patient is in for Hypertension which is increasing in severity. Diet and Lifestyle: exercises sporadically  Home BP Monitoring: is not measured at home . Patient's recent blood pressures were   BP Readings from Last 3 Encounters:   12/28/23 133/88   03/06/23 116/75   09/06/22 127/85   . not taking medications regularly as instructed, no medication side effects noted, no TIA's, no chest pain on exertion, no dyspnea on exertion, no swelling of ankles. Cardiac risk factors consist of dyslipidemia, hypertension, and male gender. Lab Results   Component Value Date/Time     09/06/2022 04:05 PM    K 3.7 09/06/2022 04:05 PM     09/06/2022 04:05 PM    CO2 29 09/06/2022 04:05 PM    BUN 7 09/06/2022 04:05 PM    CREATININE 1.00 09/06/2022 04:05 PM    GLUCOSE 96 09/06/2022 04:05 PM    CALCIUM 10.2 09/06/2022 04:05 PM    LABGLOM >60 09/06/2022 04:05 PM        Hyperlipidemia  Patient is in for follow-up for hyperlipidemia. Diet and Lifestyle: generally follows a low fat low cholesterol diet. Risk factors for vascular disease consist of hyperlipidemia and hypertension. Last LDL was   Lab Results   Component Value Date    LDLCALC 140.8 (H) 09/06/2022   . Last ALT was   Lab Results   Component Value Date/Time    ALT 55 09/06/2022 04:05 PM   .  No muscle aches. Alcohol/Elevated Liver Enzymes  Stopped drinking for the entire month of February but fell off the wagon. It is causing marital and family strife. He went to [place], \"[employer] which worked for 4 months. Back on alcohol. Going to  [employer] without benefit.   Has not tried FAVOR  Lab Results   Component Value Date    ALT 55

## 2024-06-24 DIAGNOSIS — I10 ESSENTIAL HYPERTENSION: ICD-10-CM

## 2024-06-24 RX ORDER — LISINOPRIL 20 MG/1
TABLET ORAL
Qty: 30 TABLET | Refills: 0 | Status: SHIPPED | OUTPATIENT
Start: 2024-06-24

## 2024-07-22 DIAGNOSIS — I10 ESSENTIAL HYPERTENSION: ICD-10-CM

## 2024-07-22 RX ORDER — LISINOPRIL 20 MG/1
TABLET ORAL
Qty: 30 TABLET | Refills: 0 | OUTPATIENT
Start: 2024-07-22

## 2024-07-24 ENCOUNTER — OFFICE VISIT (OUTPATIENT)
Dept: INTERNAL MEDICINE CLINIC | Facility: CLINIC | Age: 54
End: 2024-07-24
Payer: COMMERCIAL

## 2024-07-24 VITALS
BODY MASS INDEX: 30.51 KG/M2 | TEMPERATURE: 98 F | HEART RATE: 86 BPM | WEIGHT: 206 LBS | OXYGEN SATURATION: 98 % | DIASTOLIC BLOOD PRESSURE: 77 MMHG | HEIGHT: 69 IN | SYSTOLIC BLOOD PRESSURE: 114 MMHG

## 2024-07-24 DIAGNOSIS — E78.49 OTHER HYPERLIPIDEMIA: ICD-10-CM

## 2024-07-24 DIAGNOSIS — F10.10 ALCOHOL ABUSE: ICD-10-CM

## 2024-07-24 DIAGNOSIS — R74.8 ELEVATED LIVER ENZYMES: ICD-10-CM

## 2024-07-24 DIAGNOSIS — I10 ESSENTIAL HYPERTENSION: Primary | ICD-10-CM

## 2024-07-24 PROCEDURE — 99214 OFFICE O/P EST MOD 30 MIN: CPT | Performed by: INTERNAL MEDICINE

## 2024-07-24 PROCEDURE — 3078F DIAST BP <80 MM HG: CPT | Performed by: INTERNAL MEDICINE

## 2024-07-24 PROCEDURE — 3074F SYST BP LT 130 MM HG: CPT | Performed by: INTERNAL MEDICINE

## 2024-07-24 RX ORDER — LISINOPRIL 20 MG/1
TABLET ORAL
Qty: 90 TABLET | Refills: 1 | Status: SHIPPED | OUTPATIENT
Start: 2024-07-24

## 2024-07-24 RX ORDER — FENOFIBRATE 160 MG/1
160 TABLET ORAL DAILY
Qty: 90 TABLET | Refills: 1 | Status: SHIPPED | OUTPATIENT
Start: 2024-07-24

## 2024-07-24 RX ORDER — FENOFIBRATE 160 MG/1
160 TABLET ORAL DAILY
Qty: 90 TABLET | Refills: 1 | OUTPATIENT
Start: 2024-07-24

## 2024-07-24 ASSESSMENT — ENCOUNTER SYMPTOMS
SHORTNESS OF BREATH: 0
COLOR CHANGE: 0

## 2024-07-24 NOTE — PROGRESS NOTES
[facility]  Berny Parmar M.D.  Internal Medicine  [address] [place] [address]  Office : [phone]  Fax : [phone]    Chief Complaint   Patient presents with    Hypertension     6 mo follow up       History of Present Illness:  Pardeep Marsh is a 54 y.o. male.  HPI    Grieving his mother who  age 80 recently    Hypertension  Patient is in for Hypertension which is stable.    Diet and Lifestyle: generally follows a low sodium diet  exercises sporadically  nonsmoker  Home BP Monitoring: is not measured at home .Patient's recent blood pressures were   BP Readings from Last 3 Encounters:   24 114/77   23 133/88   23 116/75   .   taking medications as instructed, no medication side effects noted, no TIA's, no chest pain on exertion, no dyspnea on exertion, no swelling of ankles. Cardiac risk factors consist of dyslipidemia, hypertension, and male gender.  Lab Results   Component Value Date/Time     2023 09:45 AM    K 3.9 2023 09:45 AM     2023 09:45 AM    CO2 25 2023 09:45 AM    BUN 9 2023 09:45 AM    CREATININE 0.90 2023 09:45 AM    GLUCOSE 99 2023 09:45 AM    CALCIUM 9.5 2023 09:45 AM    LABGLOM >60 2023 09:45 AM        Hyperlipidemia  Patient is in for follow-up for hyperlipidemia.  Diet and Lifestyle: generally follows a low fat low cholesterol diet. Risk factors for vascular disease consist of hyperlipidemia and hypertension. Last LDL was   Lab Results   Component Value Date    .2 (H) 2023   . Last ALT was   Lab Results   Component Value Date/Time    ALT 43 2023 09:45 AM   .  No muscle aches.    Alcohol/Elevated Liver Enzymes  Stopped drinking for the entire month of February but fell off the wagon.  It is causing marital and family strife.  He went to [place], \"[employer] which worked for 4 months.  Back on alcohol.  Going to  [employer] without

## 2025-01-21 DIAGNOSIS — I10 ESSENTIAL HYPERTENSION: ICD-10-CM

## 2025-01-21 DIAGNOSIS — E78.49 OTHER HYPERLIPIDEMIA: ICD-10-CM

## 2025-01-21 RX ORDER — FENOFIBRATE 160 MG/1
160 TABLET ORAL DAILY
Qty: 90 TABLET | Refills: 1 | OUTPATIENT
Start: 2025-01-21

## 2025-01-21 RX ORDER — LISINOPRIL 20 MG/1
TABLET ORAL
Qty: 90 TABLET | Refills: 1 | OUTPATIENT
Start: 2025-01-21

## 2025-01-24 ENCOUNTER — OFFICE VISIT (OUTPATIENT)
Dept: INTERNAL MEDICINE CLINIC | Facility: CLINIC | Age: 55
End: 2025-01-24
Payer: COMMERCIAL

## 2025-01-24 VITALS
WEIGHT: 213.1 LBS | HEIGHT: 69 IN | DIASTOLIC BLOOD PRESSURE: 98 MMHG | TEMPERATURE: 97 F | BODY MASS INDEX: 31.56 KG/M2 | HEART RATE: 79 BPM | SYSTOLIC BLOOD PRESSURE: 130 MMHG | OXYGEN SATURATION: 95 %

## 2025-01-24 DIAGNOSIS — I10 ESSENTIAL HYPERTENSION: Primary | ICD-10-CM

## 2025-01-24 DIAGNOSIS — I10 ESSENTIAL HYPERTENSION: ICD-10-CM

## 2025-01-24 DIAGNOSIS — M10.9 PODAGRA: ICD-10-CM

## 2025-01-24 DIAGNOSIS — F10.10 ALCOHOL ABUSE: ICD-10-CM

## 2025-01-24 DIAGNOSIS — Z12.5 SCREENING FOR PROSTATE CANCER: ICD-10-CM

## 2025-01-24 DIAGNOSIS — M77.11 RIGHT LATERAL EPICONDYLITIS: ICD-10-CM

## 2025-01-24 DIAGNOSIS — E78.49 OTHER HYPERLIPIDEMIA: ICD-10-CM

## 2025-01-24 LAB
ALBUMIN SERPL-MCNC: 3.9 G/DL (ref 3.5–5)
ALBUMIN/GLOB SERPL: 1 (ref 1–1.9)
ALP SERPL-CCNC: 37 U/L (ref 40–129)
ALT SERPL-CCNC: 23 U/L (ref 8–55)
ANION GAP SERPL CALC-SCNC: 14 MMOL/L (ref 7–16)
AST SERPL-CCNC: 53 U/L (ref 15–37)
BASOPHILS # BLD: 0.04 K/UL (ref 0–0.2)
BASOPHILS NFR BLD: 1 % (ref 0–2)
BILIRUB DIRECT SERPL-MCNC: <0.2 MG/DL (ref 0–0.4)
BILIRUB SERPL-MCNC: 0.5 MG/DL (ref 0–1.2)
BUN SERPL-MCNC: 9 MG/DL (ref 6–23)
CALCIUM SERPL-MCNC: 9.1 MG/DL (ref 8.8–10.2)
CHLORIDE SERPL-SCNC: 102 MMOL/L (ref 98–107)
CHOLEST SERPL-MCNC: 231 MG/DL (ref 0–200)
CO2 SERPL-SCNC: 25 MMOL/L (ref 20–29)
CREAT SERPL-MCNC: 0.89 MG/DL (ref 0.8–1.3)
DIFFERENTIAL METHOD BLD: ABNORMAL
EOSINOPHIL # BLD: 0.16 K/UL (ref 0–0.8)
EOSINOPHIL NFR BLD: 4.2 % (ref 0.5–7.8)
ERYTHROCYTE [DISTWIDTH] IN BLOOD BY AUTOMATED COUNT: 12.4 % (ref 11.9–14.6)
GLOBULIN SER CALC-MCNC: 4 G/DL (ref 2.3–3.5)
GLUCOSE SERPL-MCNC: 102 MG/DL (ref 70–99)
HCT VFR BLD AUTO: 45.4 % (ref 41.1–50.3)
HDLC SERPL-MCNC: 42 MG/DL (ref 40–60)
HDLC SERPL: 5.4 (ref 0–5)
HGB BLD-MCNC: 15.2 G/DL (ref 13.6–17.2)
IMM GRANULOCYTES # BLD AUTO: 0.02 K/UL (ref 0–0.5)
IMM GRANULOCYTES NFR BLD AUTO: 0.5 % (ref 0–5)
LDLC SERPL CALC-MCNC: 139 MG/DL (ref 0–100)
LYMPHOCYTES # BLD: 1.76 K/UL (ref 0.5–4.6)
LYMPHOCYTES NFR BLD: 45.8 % (ref 13–44)
MCH RBC QN AUTO: 31.2 PG (ref 26.1–32.9)
MCHC RBC AUTO-ENTMCNC: 33.5 G/DL (ref 31.4–35)
MCV RBC AUTO: 93.2 FL (ref 82–102)
MONOCYTES # BLD: 0.48 K/UL (ref 0.1–1.3)
MONOCYTES NFR BLD: 12.5 % (ref 4–12)
NEUTS SEG # BLD: 1.38 K/UL (ref 1.7–8.2)
NEUTS SEG NFR BLD: 36 % (ref 43–78)
NRBC # BLD: 0 K/UL (ref 0–0.2)
PLATELET # BLD AUTO: 251 K/UL (ref 150–450)
PMV BLD AUTO: 9.4 FL (ref 9.4–12.3)
POTASSIUM SERPL-SCNC: 3.9 MMOL/L (ref 3.5–5.1)
PROT SERPL-MCNC: 7.8 G/DL (ref 6.3–8.2)
PSA SERPL-MCNC: 0.8 NG/ML (ref 0–4)
RBC # BLD AUTO: 4.87 M/UL (ref 4.23–5.6)
SODIUM SERPL-SCNC: 141 MMOL/L (ref 136–145)
TRIGL SERPL-MCNC: 250 MG/DL (ref 0–150)
URATE SERPL-MCNC: 6.7 MG/DL (ref 3.9–8.2)
VLDLC SERPL CALC-MCNC: 50 MG/DL (ref 6–23)
WBC # BLD AUTO: 3.8 K/UL (ref 4.3–11.1)

## 2025-01-24 PROCEDURE — 99214 OFFICE O/P EST MOD 30 MIN: CPT | Performed by: INTERNAL MEDICINE

## 2025-01-24 PROCEDURE — 3080F DIAST BP >= 90 MM HG: CPT | Performed by: INTERNAL MEDICINE

## 2025-01-24 PROCEDURE — 3075F SYST BP GE 130 - 139MM HG: CPT | Performed by: INTERNAL MEDICINE

## 2025-01-24 RX ORDER — LISINOPRIL 20 MG/1
TABLET ORAL
Qty: 90 TABLET | Refills: 1 | Status: CANCELLED | OUTPATIENT
Start: 2025-01-24

## 2025-01-24 RX ORDER — FENOFIBRATE 160 MG/1
160 TABLET ORAL DAILY
Qty: 90 TABLET | Refills: 1 | Status: SHIPPED | OUTPATIENT
Start: 2025-01-24

## 2025-01-24 RX ORDER — LISINOPRIL AND HYDROCHLOROTHIAZIDE 12.5; 2 MG/1; MG/1
1 TABLET ORAL DAILY
Qty: 90 TABLET | Refills: 1 | Status: SHIPPED | OUTPATIENT
Start: 2025-01-24

## 2025-01-24 RX ORDER — NALTREXONE HYDROCHLORIDE 50 MG/1
50 TABLET, FILM COATED ORAL DAILY
Qty: 90 TABLET | Refills: 1 | Status: SHIPPED | OUTPATIENT
Start: 2025-01-24

## 2025-01-24 SDOH — ECONOMIC STABILITY: FOOD INSECURITY: WITHIN THE PAST 12 MONTHS, THE FOOD YOU BOUGHT JUST DIDN'T LAST AND YOU DIDN'T HAVE MONEY TO GET MORE.: NEVER TRUE

## 2025-01-24 SDOH — ECONOMIC STABILITY: FOOD INSECURITY: WITHIN THE PAST 12 MONTHS, YOU WORRIED THAT YOUR FOOD WOULD RUN OUT BEFORE YOU GOT MONEY TO BUY MORE.: NEVER TRUE

## 2025-01-24 ASSESSMENT — PATIENT HEALTH QUESTIONNAIRE - PHQ9
SUM OF ALL RESPONSES TO PHQ QUESTIONS 1-9: 0
1. LITTLE INTEREST OR PLEASURE IN DOING THINGS: NOT AT ALL
SUM OF ALL RESPONSES TO PHQ QUESTIONS 1-9: 0
SUM OF ALL RESPONSES TO PHQ QUESTIONS 1-9: 0
SUM OF ALL RESPONSES TO PHQ9 QUESTIONS 1 & 2: 0
2. FEELING DOWN, DEPRESSED OR HOPELESS: NOT AT ALL
SUM OF ALL RESPONSES TO PHQ QUESTIONS 1-9: 0

## 2025-01-24 ASSESSMENT — ENCOUNTER SYMPTOMS: SHORTNESS OF BREATH: 0

## 2025-01-24 NOTE — PROGRESS NOTES
[facility]  Berny Parmar M.D.  Internal Medicine  [address] [place] [id number]  Office : [phone]  Fax : [phone]    Chief Complaint   Patient presents with    Hypertension       History of Present Illness:  Pardeep Marsh is a 54 y.o. male.  HPI    Hypertension  Patient is in for Hypertension which is  not ideally controlled .    Diet and Lifestyle: generally follows a low sodium diet  exercises sporadically  nonsmoker  Home BP Monitoring: is not measured at home .Patient's recent blood pressures were   BP Readings from Last 3 Encounters:   01/24/25 (!) 130/98   07/24/24 114/77   12/28/23 133/88   .   taking medications as instructed, no medication side effects noted, no TIA's, no chest pain on exertion, no dyspnea on exertion, no swelling of ankles. Cardiac risk factors consist of hypertension and male gender.  Lab Results   Component Value Date/Time     12/28/2023 09:45 AM    K 3.9 12/28/2023 09:45 AM     12/28/2023 09:45 AM    CO2 25 12/28/2023 09:45 AM    BUN 9 12/28/2023 09:45 AM    CREATININE 0.90 12/28/2023 09:45 AM    GLUCOSE 99 12/28/2023 09:45 AM    CALCIUM 9.5 12/28/2023 09:45 AM    LABGLOM >60 12/28/2023 09:45 AM        Hyperlipidemia  Patient is in for follow-up for hyperlipidemia.  Diet and Lifestyle: generally follows a low fat low cholesterol diet. Risk factors for vascular disease consist of hyperlipidemia and hypertension. Last LDL was   Lab Results   Component Value Date    .2 (H) 12/28/2023   . Last ALT was   Lab Results   Component Value Date/Time    ALT 43 12/28/2023 09:45 AM   .  No muscle aches.    Alcohol/Elevated Liver Enzymes  Recently had an ER visit with alcohol level of 379.  Still drinking 5-6 beers every night after work  Lab Results   Component Value Date    ALT 43 12/28/2023    AST 51 (H) 12/28/2023    ALKPHOS 43 (L) 12/28/2023    BILITOT 0.6 12/28/2023     Having intermittent pain in left MTP

## 2025-01-24 NOTE — ACP (ADVANCE CARE PLANNING)
Advance Care Planning   The patient has the following advanced directives on file:  Advance Directives       Power of  Living Will ACP-Advance Directive ACP-Power of     Not on File Not on File Not on File Not on File            The patient has appointed the following active healthcare agents:    Primary Decision Maker: Patricia Abbott - [place] - [phone]    The Patient has the following current code status:    Code Status: Not on file    Visit Documentation:  I discussed Advance Care Planning with Pardeep Marsh today which included the importance of making their choices for care and treatment in the case of a health event that adversely affects their decision-making abilities. He has not completed the Advance Care Directives. He does not have an active health care agent at this time.  Pardeep Marsh was encouraged to complete the declaration forms and provide a signed copy of his medical records.  I advised patient we would continue this discussion at future visits.     Sharyn Donato  1/24/2025

## 2025-05-20 ENCOUNTER — TELEPHONE (OUTPATIENT)
Dept: INTERNAL MEDICINE CLINIC | Facility: CLINIC | Age: 55
End: 2025-05-20

## 2025-05-20 NOTE — TELEPHONE ENCOUNTER
Called and advised patient, per Dr. Parmar, that he can discontinue fenofibrate for now. He states he will discontinue this when he leaves for rehab on 5/27/25.

## 2025-05-20 NOTE — TELEPHONE ENCOUNTER
Patient states he is checking himself into a rehab facility on 5/27/25. He wants to know if and when he can discontinue to fenofibrate? Please advise.

## 2025-07-22 DIAGNOSIS — I10 ESSENTIAL HYPERTENSION: ICD-10-CM

## 2025-07-22 DIAGNOSIS — E78.49 OTHER HYPERLIPIDEMIA: ICD-10-CM

## 2025-07-22 RX ORDER — FENOFIBRATE 160 MG/1
160 TABLET ORAL DAILY
Qty: 90 TABLET | Refills: 1 | OUTPATIENT
Start: 2025-07-22

## 2025-07-22 RX ORDER — LISINOPRIL AND HYDROCHLOROTHIAZIDE 12.5; 2 MG/1; MG/1
1 TABLET ORAL DAILY
Qty: 90 TABLET | Refills: 1 | OUTPATIENT
Start: 2025-07-22

## 2025-07-24 ENCOUNTER — OFFICE VISIT (OUTPATIENT)
Dept: INTERNAL MEDICINE CLINIC | Facility: CLINIC | Age: 55
End: 2025-07-24
Payer: COMMERCIAL

## 2025-07-24 VITALS
BODY MASS INDEX: 29.96 KG/M2 | TEMPERATURE: 97.9 F | WEIGHT: 214 LBS | HEART RATE: 59 BPM | HEIGHT: 71 IN | SYSTOLIC BLOOD PRESSURE: 124 MMHG | DIASTOLIC BLOOD PRESSURE: 68 MMHG | OXYGEN SATURATION: 98 %

## 2025-07-24 DIAGNOSIS — I10 ESSENTIAL HYPERTENSION: ICD-10-CM

## 2025-07-24 DIAGNOSIS — E78.49 OTHER HYPERLIPIDEMIA: Primary | ICD-10-CM

## 2025-07-24 DIAGNOSIS — E78.49 OTHER HYPERLIPIDEMIA: ICD-10-CM

## 2025-07-24 DIAGNOSIS — R74.8 ELEVATED LIVER ENZYMES: ICD-10-CM

## 2025-07-24 LAB
ALBUMIN SERPL-MCNC: 3.5 G/DL (ref 3.5–5)
ALBUMIN/GLOB SERPL: 1 (ref 1–1.9)
ALP SERPL-CCNC: 48 U/L (ref 40–129)
ALT SERPL-CCNC: 21 U/L (ref 8–55)
ANION GAP SERPL CALC-SCNC: 13 MMOL/L (ref 7–16)
AST SERPL-CCNC: 25 U/L (ref 15–37)
BASOPHILS # BLD: 0.03 K/UL (ref 0–0.2)
BASOPHILS NFR BLD: 0.5 % (ref 0–2)
BILIRUB DIRECT SERPL-MCNC: 0.3 MG/DL (ref 0–0.3)
BILIRUB SERPL-MCNC: 0.8 MG/DL (ref 0–1.2)
BUN SERPL-MCNC: 13 MG/DL (ref 6–23)
CALCIUM SERPL-MCNC: 9.8 MG/DL (ref 8.8–10.2)
CHLORIDE SERPL-SCNC: 103 MMOL/L (ref 98–107)
CHOLEST SERPL-MCNC: 199 MG/DL (ref 0–200)
CO2 SERPL-SCNC: 24 MMOL/L (ref 20–29)
CREAT SERPL-MCNC: 0.76 MG/DL (ref 0.8–1.3)
DIFFERENTIAL METHOD BLD: NORMAL
EOSINOPHIL # BLD: 0.21 K/UL (ref 0–0.8)
EOSINOPHIL NFR BLD: 3.5 % (ref 0.5–7.8)
ERYTHROCYTE [DISTWIDTH] IN BLOOD BY AUTOMATED COUNT: 11.9 % (ref 11.9–14.6)
GLOBULIN SER CALC-MCNC: 3.6 G/DL (ref 2.3–3.5)
GLUCOSE SERPL-MCNC: 98 MG/DL (ref 70–99)
HCT VFR BLD AUTO: 43.5 % (ref 41.1–50.3)
HDLC SERPL-MCNC: 34 MG/DL (ref 40–60)
HDLC SERPL: 5.9 (ref 0–5)
HGB BLD-MCNC: 14.7 G/DL (ref 13.6–17.2)
IMM GRANULOCYTES # BLD AUTO: 0.02 K/UL (ref 0–0.5)
IMM GRANULOCYTES NFR BLD AUTO: 0.3 % (ref 0–5)
LDLC SERPL CALC-MCNC: 138 MG/DL (ref 0–100)
LYMPHOCYTES # BLD: 1.9 K/UL (ref 0.5–4.6)
LYMPHOCYTES NFR BLD: 31.9 % (ref 13–44)
MCH RBC QN AUTO: 29.5 PG (ref 26.1–32.9)
MCHC RBC AUTO-ENTMCNC: 33.8 G/DL (ref 31.4–35)
MCV RBC AUTO: 87.3 FL (ref 82–102)
MONOCYTES # BLD: 0.53 K/UL (ref 0.1–1.3)
MONOCYTES NFR BLD: 8.9 % (ref 4–12)
NEUTS SEG # BLD: 3.26 K/UL (ref 1.7–8.2)
NEUTS SEG NFR BLD: 54.9 % (ref 43–78)
NRBC # BLD: 0 K/UL (ref 0–0.2)
PLATELET # BLD AUTO: 302 K/UL (ref 150–450)
PMV BLD AUTO: 10.1 FL (ref 9.4–12.3)
POTASSIUM SERPL-SCNC: 3.9 MMOL/L (ref 3.5–5.1)
PROT SERPL-MCNC: 7.1 G/DL (ref 6.3–8.2)
RBC # BLD AUTO: 4.98 M/UL (ref 4.23–5.6)
SODIUM SERPL-SCNC: 140 MMOL/L (ref 136–145)
TRIGL SERPL-MCNC: 135 MG/DL (ref 0–150)
VLDLC SERPL CALC-MCNC: 27 MG/DL (ref 6–23)
WBC # BLD AUTO: 6 K/UL (ref 4.3–11.1)

## 2025-07-24 PROCEDURE — 3074F SYST BP LT 130 MM HG: CPT | Performed by: INTERNAL MEDICINE

## 2025-07-24 PROCEDURE — 99214 OFFICE O/P EST MOD 30 MIN: CPT | Performed by: INTERNAL MEDICINE

## 2025-07-24 PROCEDURE — 3078F DIAST BP <80 MM HG: CPT | Performed by: INTERNAL MEDICINE

## 2025-07-24 RX ORDER — LISINOPRIL AND HYDROCHLOROTHIAZIDE 12.5; 2 MG/1; MG/1
1 TABLET ORAL DAILY
Qty: 90 TABLET | Refills: 1 | Status: SHIPPED | OUTPATIENT
Start: 2025-07-24

## 2025-07-24 ASSESSMENT — ENCOUNTER SYMPTOMS
ABDOMINAL PAIN: 0
SHORTNESS OF BREATH: 0

## 2025-07-24 NOTE — PROGRESS NOTES
[facility]  Berny Parmar M.D.  Internal Medicine  [address] [place] [address]  Office : [phone]  Fax : [phone]    Chief Complaint   Patient presents with    Hypertension     6 month follow up       History of Present Illness:  Pardeep Marsh is a 55 y.o. male.  HPI    Hypertension  Patient is in for Hypertension which is stable.    Diet and Lifestyle: generally follows a low sodium diet  exercises sporadically  nonsmoker  Home BP Monitoring: is well controlled at home, Reviewed blood pressure diary with patient at todays visit. .Patient's recent blood pressures were   BP Readings from Last 3 Encounters:   07/24/25 124/68   01/24/25 (!) 130/98   07/24/24 114/77   .   taking medications as instructed, no medication side effects noted, no TIA's, no chest pain on exertion, no dyspnea on exertion, no swelling of ankles. Cardiac risk factors consist of dyslipidemia and hypertension.  Lab Results   Component Value Date/Time     01/24/2025 08:40 AM    K 3.9 01/24/2025 08:40 AM     01/24/2025 08:40 AM    CO2 25 01/24/2025 08:40 AM    BUN 9 01/24/2025 08:40 AM    CREATININE 0.89 01/24/2025 08:40 AM    GLUCOSE 102 01/24/2025 08:40 AM    CALCIUM 9.1 01/24/2025 08:40 AM    LABGLOM >90 01/24/2025 08:40 AM    LABGLOM >60 12/28/2023 09:45 AM        Hyperlipidemia  Patient is in for follow-up for hyperlipidemia.  Diet and Lifestyle: generally follows a low fat low cholesterol diet. Risk factors for vascular disease consist of hyperlipidemia and hypertension. Last LDL was   Lab Results   Component Value Date     (H) 01/24/2025   . Last ALT was   Lab Results   Component Value Date/Time    ALT 23 01/24/2025 08:40 AM   .  No muscle aches.    Alcohol/Elevated Liver Enzymes  Checked into a rehab facility May 30th at [facility] and had an alcohol withdrawal seizure and is no longer drinking for 58 days  Lab Results   Component Value Date    ALT 23